# Patient Record
Sex: FEMALE | Race: WHITE | NOT HISPANIC OR LATINO | URBAN - METROPOLITAN AREA
[De-identification: names, ages, dates, MRNs, and addresses within clinical notes are randomized per-mention and may not be internally consistent; named-entity substitution may affect disease eponyms.]

---

## 2020-11-18 ENCOUNTER — EMERGENCY (EMERGENCY)
Facility: HOSPITAL | Age: 85
LOS: 1 days | Discharge: HOME | End: 2020-11-18
Attending: EMERGENCY MEDICINE | Admitting: EMERGENCY MEDICINE
Payer: MEDICARE

## 2020-11-18 VITALS
WEIGHT: 95.9 LBS | RESPIRATION RATE: 16 BRPM | OXYGEN SATURATION: 97 % | HEART RATE: 100 BPM | DIASTOLIC BLOOD PRESSURE: 80 MMHG | HEIGHT: 63 IN | SYSTOLIC BLOOD PRESSURE: 137 MMHG

## 2020-11-18 VITALS
TEMPERATURE: 98 F | OXYGEN SATURATION: 98 % | DIASTOLIC BLOOD PRESSURE: 73 MMHG | HEART RATE: 69 BPM | RESPIRATION RATE: 16 BRPM | SYSTOLIC BLOOD PRESSURE: 138 MMHG

## 2020-11-18 LAB
APPEARANCE UR: CLEAR — SIGNIFICANT CHANGE UP
BACTERIA # UR AUTO: NEGATIVE — SIGNIFICANT CHANGE UP
BASOPHILS # BLD AUTO: 0.03 K/UL — SIGNIFICANT CHANGE UP (ref 0–0.2)
BASOPHILS NFR BLD AUTO: 0.7 % — SIGNIFICANT CHANGE UP (ref 0–2)
BILIRUB UR-MCNC: NEGATIVE — SIGNIFICANT CHANGE UP
CK MB CFR SERPL CALC: 2.4 NG/ML — SIGNIFICANT CHANGE UP (ref 0–3.8)
COLOR SPEC: SIGNIFICANT CHANGE UP
DIFF PNL FLD: NEGATIVE — SIGNIFICANT CHANGE UP
EOSINOPHIL # BLD AUTO: 0.07 K/UL — SIGNIFICANT CHANGE UP (ref 0–0.5)
EOSINOPHIL NFR BLD AUTO: 1.6 % — SIGNIFICANT CHANGE UP (ref 0–6)
EPI CELLS # UR: 0 /HPF — SIGNIFICANT CHANGE UP
GLUCOSE UR QL: NEGATIVE — SIGNIFICANT CHANGE UP
HCT VFR BLD CALC: 45.6 % — HIGH (ref 34.5–45)
HGB BLD-MCNC: 14.8 G/DL — SIGNIFICANT CHANGE UP (ref 11.5–15.5)
IMM GRANULOCYTES NFR BLD AUTO: 0.2 % — SIGNIFICANT CHANGE UP (ref 0–1.5)
KETONES UR-MCNC: NEGATIVE — SIGNIFICANT CHANGE UP
LEUKOCYTE ESTERASE UR-ACNC: NEGATIVE — SIGNIFICANT CHANGE UP
LYMPHOCYTES # BLD AUTO: 0.68 K/UL — LOW (ref 1–3.3)
LYMPHOCYTES # BLD AUTO: 15.1 % — SIGNIFICANT CHANGE UP (ref 13–44)
MCHC RBC-ENTMCNC: 30 PG — SIGNIFICANT CHANGE UP (ref 27–34)
MCHC RBC-ENTMCNC: 32.5 GM/DL — SIGNIFICANT CHANGE UP (ref 32–36)
MCV RBC AUTO: 92.5 FL — SIGNIFICANT CHANGE UP (ref 80–100)
MONOCYTES # BLD AUTO: 0.49 K/UL — SIGNIFICANT CHANGE UP (ref 0–0.9)
MONOCYTES NFR BLD AUTO: 10.9 % — SIGNIFICANT CHANGE UP (ref 2–14)
NEUTROPHILS # BLD AUTO: 3.23 K/UL — SIGNIFICANT CHANGE UP (ref 1.8–7.4)
NEUTROPHILS NFR BLD AUTO: 71.5 % — SIGNIFICANT CHANGE UP (ref 43–77)
NITRITE UR-MCNC: NEGATIVE — SIGNIFICANT CHANGE UP
NRBC # BLD: 0 /100 WBCS — SIGNIFICANT CHANGE UP (ref 0–0)
PH UR: 7.5 — SIGNIFICANT CHANGE UP (ref 5–8)
PLATELET # BLD AUTO: 245 K/UL — SIGNIFICANT CHANGE UP (ref 150–400)
PROT UR-MCNC: NEGATIVE — SIGNIFICANT CHANGE UP
RBC # BLD: 4.93 M/UL — SIGNIFICANT CHANGE UP (ref 3.8–5.2)
RBC # FLD: 13.4 % — SIGNIFICANT CHANGE UP (ref 10.3–14.5)
RBC CASTS # UR COMP ASSIST: 1 /HPF — SIGNIFICANT CHANGE UP (ref 0–4)
SP GR SPEC: 1.01 — SIGNIFICANT CHANGE UP (ref 1.01–1.02)
TROPONIN T, HIGH SENSITIVITY RESULT: 17 NG/L — SIGNIFICANT CHANGE UP (ref 0–51)
UROBILINOGEN FLD QL: NEGATIVE — SIGNIFICANT CHANGE UP
WBC # BLD: 4.51 K/UL — SIGNIFICANT CHANGE UP (ref 3.8–10.5)
WBC # FLD AUTO: 4.51 K/UL — SIGNIFICANT CHANGE UP (ref 3.8–10.5)
WBC UR QL: 0 /HPF — SIGNIFICANT CHANGE UP (ref 0–5)

## 2020-11-18 PROCEDURE — 93005 ELECTROCARDIOGRAM TRACING: CPT

## 2020-11-18 PROCEDURE — 87086 URINE CULTURE/COLONY COUNT: CPT

## 2020-11-18 PROCEDURE — 80053 COMPREHEN METABOLIC PANEL: CPT

## 2020-11-18 PROCEDURE — 99285 EMERGENCY DEPT VISIT HI MDM: CPT

## 2020-11-18 PROCEDURE — 70450 CT HEAD/BRAIN W/O DYE: CPT | Mod: 26

## 2020-11-18 PROCEDURE — 82553 CREATINE MB FRACTION: CPT

## 2020-11-18 PROCEDURE — 93010 ELECTROCARDIOGRAM REPORT: CPT

## 2020-11-18 PROCEDURE — 81001 URINALYSIS AUTO W/SCOPE: CPT

## 2020-11-18 PROCEDURE — 71045 X-RAY EXAM CHEST 1 VIEW: CPT | Mod: 26

## 2020-11-18 PROCEDURE — 99284 EMERGENCY DEPT VISIT MOD MDM: CPT

## 2020-11-18 PROCEDURE — 84484 ASSAY OF TROPONIN QUANT: CPT

## 2020-11-18 PROCEDURE — 85025 COMPLETE CBC W/AUTO DIFF WBC: CPT

## 2020-11-18 PROCEDURE — 70450 CT HEAD/BRAIN W/O DYE: CPT

## 2020-11-18 PROCEDURE — 71045 X-RAY EXAM CHEST 1 VIEW: CPT

## 2020-11-18 RX ORDER — SODIUM CHLORIDE 9 MG/ML
500 INJECTION INTRAMUSCULAR; INTRAVENOUS; SUBCUTANEOUS ONCE
Refills: 0 | Status: COMPLETED | OUTPATIENT
Start: 2020-11-18 | End: 2020-11-18

## 2020-11-18 RX ADMIN — SODIUM CHLORIDE 500 MILLILITER(S): 9 INJECTION INTRAMUSCULAR; INTRAVENOUS; SUBCUTANEOUS at 15:47

## 2020-11-18 NOTE — ED ADULT NURSE NOTE - NSIMPLEMENTINTERV_GEN_ALL_ED
Implemented All Fall with Harm Risk Interventions:  [place] to call system. Call bell, personal items and telephone within reach. Instruct patient to call for assistance. Room bathroom lighting operational. Non-slip footwear when patient is off stretcher. Physically safe environment: no spills, clutter or unnecessary equipment. Stretcher in lowest position, wheels locked, appropriate side rails in place. Provide visual cue, wrist band, yellow gown, etc. Monitor gait and stability. Monitor for mental status changes and reorient to person, place, and time. Review medications for side effects contributing to fall risk. Reinforce activity limits and safety measures with patient and family. Provide visual clues: red socks.

## 2020-11-18 NOTE — ED PROVIDER NOTE - PATIENT PORTAL LINK FT
You can access the FollowMyHealth Patient Portal offered by [facility] by registering at the following website: [email or web address]. By joining [email or web address]’s FollowMyHealth portal, you will also be able to view your health information using other applications (apps) compatible with our system.

## 2020-11-18 NOTE — ED PROVIDER NOTE - ATTENDING CONTRIBUTION TO CARE
89 yo female with no PMHx p/w failure to thrive with decrease po intake, well appearing, vss, labs sent, ivf, signed out pending work up, r/o metabolic vs infectious etiology.

## 2020-11-18 NOTE — ED PROVIDER NOTE - NSFOLLOWUPINSTRUCTIONS_ED_ALL_ED_FT
1.  Stay well hydrated  2.  Continue current home medications  3.  Follow up with your PMD and psychiatry upon discharge  4.  Return to the ER for fevers, weakness, chest pain, shortness of breath or any other concerning symptoms.

## 2020-11-18 NOTE — ED ADULT NURSE REASSESSMENT NOTE - NS ED NURSE REASSESS COMMENT FT1
Patient eating and resting in the stretcher. No pain. Stable. Safety measures maintained. Will continue to monitor.

## 2020-11-18 NOTE — ED PROVIDER NOTE - OBJECTIVE STATEMENT
89 yo female with no PMHx p/w failure to thrive.  Patient reports that she is feeling well and does not know why she is here.  States that she was laying in bed at her nursing facility and told staff that she did not want to get up.  States "I am 89 years old, if I want to stay in bed I should be able to." Denies fevers, coug, sob, chest pain, abd pain, nvd, dysuria.  Discussed case with patient's daughter Tressa who reports that she has had worsening memory issues over the past year.  Feels that she has been more depressed over the past few months 2/2 quarantine.  Does not like to see doctors at the NH. 89 yo female with no PMHx p/w failure to thrive.  Patient reports that she is feeling well and does not know why she is here.  States that she was laying in bed at her nursing facility and told staff that she did not want to get up.  States "I am 89 years old, if I want to stay in bed I should be able to." Denies fevers, coug, sob, chest pain, abd pain, nvd, dysuria.  Discussed case with patient's daughter Tressa who reports that she has had worsening memory issues over the past year.  Feels that she has been more depressed over the past few months 2/2 quarantine. No SI/HI. Does not like to see doctors at the NH.

## 2020-11-18 NOTE — ED ADULT NURSE REASSESSMENT NOTE - NS ED NURSE REASSESS COMMENT FT1
LEONARDO BOOTH called the [facility] to inform LEONARDO Feldman that Pt will be returning to the facility by ambulate in the next few hours. Pt is denying any passive SI at this time.

## 2020-11-18 NOTE — CHART NOTE - NSCHARTNOTEFT_GEN_A_CORE
Covering ED Social Work Note:  Patient referred to Social Work by Medical Team to assist with discharge planning. Medical chart reviewed. As per H&P, “patient is a 89 yo female with no PMHx p/w failure to thrive.  Patient reports that she is feeling well and does not know why she is here.  States that she was laying in bed at her nursing facility and told staff that she did not want to get up.  States "I am 89 years old, if I want to stay in bed I should be able to."  Patient’s daughter contacted by Medical Team for further collateral. As reported by Medical Team, patient is medically cleared to return home.   As noted, patient resides at [facility], memory impairment unit. LMSW contacted facility [phone] and spoke with supervisor Juana. As reported the patient is cleared to return with discharge paperwork. Patient being recommended for Home with [facility]. LMSW sent referral to [facility] via for review. LMSW contacted patient’s daughter Tressa [phone] and left a non-urgent voicemail void of phi with this writer’s contact information. Transportation to be arranged via ED for return home. Social Work will continue to remain available and collaborate with interdisciplinary team.

## 2020-11-18 NOTE — ED ADULT NURSE REASSESSMENT NOTE - NS ED NURSE REASSESS COMMENT FT1
patient resting in the stretcher. No complaints at this time. Safety measures maintained. Awaiting Ct results. Will continue to monitor.

## 2020-11-18 NOTE — ED ADULT NURSE REASSESSMENT NOTE - NS ED NURSE REASSESS COMMENT FT1
Report received from RN Renee. Pt is resting comfortably in bed. Pt toileted. Pt A&Ox2 (now and at baseline). Pt informed of POC and awaiting ambulate back to her skilled nursing facility. Pt has no complaints at this time. Pt safety maintained.

## 2020-11-18 NOTE — ED PROVIDER NOTE - PHYSICAL EXAMINATION
Gen: AAO x 2, NAD  Skin: No rashes or lesions  HEENT: NC/AT, PERRLA, EOMI, MMM  Resp: unlabored CTAB  Cardiac: rrr s1s2, no murmurs, rubs or gallops  GI: ND, +BS, Soft, NT  Ext: no pedal edema, FROM in all extremities  Neuro: no focal deficits

## 2020-11-18 NOTE — ED ADULT NURSE NOTE - OBJECTIVE STATEMENT
89 y/o female coming in from [facility] via EMS to the ER with a c/o failure to thrive. A&Ox2 (person and place). Ambulatory at baseline. PMH dementia. According to patients daughter and EMS patient stopped eating yesterday and reportedly said that "she just wants to die." Patient's daughter denies any significant history. Patient reports, "I am 89 years old I just want to not get up, and die, there is nothing wrong with me."  Capillary refill less than 2 seconds. Patient denies chest pain, SOB, abdominal pain, fevers, chills, headache. MD at the bedside. Safety measures maintained. Will continue to monitor. 89 y/o female coming in from [facility] via EMS to the ER with a c/o failure to thrive. A&Ox2 (person and place). Ambulatory at baseline. PMH dementia. According to EMS her hearing aids were left at the facility. According to patients daughter and EMS patient stopped eating yesterday and reportedly said that "she just wants to die." Patient's daughter denies any significant history. Patient reports, "I am 89 years old I just want to not get up, and die, there is nothing wrong with me."  Capillary refill less than 2 seconds. Patient denies chest pain, SOB, abdominal pain, fevers, chills, headache. MD at the bedside. Safety measures maintained. Will continue to monitor. 89 y/o female coming in from [facility] via EMS to the ER with a c/o failure to thrive. A&Ox2 (person and place). Ambulatory at baseline. PMH dementia. EMS states that her hearing aids were left at the facility. According to patients daughter and EMS patient stopped eating yesterday and reportedly said that "she just wants to die." Patient's daughter denies any significant history. Patient reports, "I am 89 years old I just want to not get up, and die, there is nothing wrong with me."  Capillary refill less than 2 seconds. Patient denies chest pain, SOB, abdominal pain, fevers, chills, headache. MD at the bedside. Safety measures maintained. Will continue to monitor.

## 2020-11-19 LAB
CULTURE RESULTS: NO GROWTH — SIGNIFICANT CHANGE UP
SPECIMEN SOURCE: SIGNIFICANT CHANGE UP

## 2024-10-15 NOTE — ED ADULT TRIAGE NOTE - SPO2 (%)
"  Assessment & Plan     Acute cough  Worsening cough with lots of sputum expectoration , see below   - Symptomatic Influenza A/B, RSV, & SARS-CoV2 PCR (COVID-19) Nose; Future  - Symptomatic Influenza A/B, RSV, & SARS-CoV2 PCR (COVID-19) Nose  - guaiFENesin-codeine (ROBITUSSIN AC) 100-10 MG/5ML solution; Take 5-10 mLs by mouth at bedtime.    Bronchitis  We discussed using the tessalon perls during the day for the cough and then the Robitussin AC at bedtime to help her sleep , lots of hydration , vitamin D and C and zinc supplement daily   If not improving by the end of the week can start the zpack , Rx sent   - benzonatate (TESSALON) 100 MG capsule; Take 1 capsule (100 mg) by mouth 3 times daily as needed for cough.  - azithromycin (ZITHROMAX) 250 MG tablet; Take 2 tablets (500 mg) by mouth daily for 1 day, THEN 1 tablet (250 mg) daily for 4 days.  - albuterol (PROAIR HFA/PROVENTIL HFA/VENTOLIN HFA) 108 (90 Base) MCG/ACT inhaler; Inhale 2 puffs into the lungs every 6 hours as needed for shortness of breath, wheezing or cough.    Letter written for her to be off work till the end of the week , she works in a school       BMI  Estimated body mass index is 27.51 kg/m  as calculated from the following:    Height as of 2/21/24: 1.575 m (5' 2\").    Weight as of this encounter: 68.2 kg (150 lb 6.4 oz).         RTC if no improving or worsening.  Pt is aware  and comfortable with the current plan.      Darlin Topete is a 49 year old, presenting for the following health issues:  URI      10/15/2024    10:14 AM   Additional Questions   Roomed by Tyra BROWNLEE   Accompanied by n/a     URI    History of Present Illness       Reason for visit:  Cough  Symptom onset:  3-7 days ago  Symptoms include:  Cough congestion  Symptom intensity:  Moderate  Symptom progression:  Worsening  Had these symptoms before:  No   She is taking medications regularly.     No fever cough moist congested chest tightness  no asthma now but as a child , "   Mucous not a smoker , no immunosuppressed , 4 days of this and not better   OTC she took Claritin , cough   Acute Illness  Acute illness concerns: Cough  Onset/Duration: 4 days   Symptoms:  Fever: No  Chills/Sweats: No  Headache (location?): No  Sinus Pressure: YES  Conjunctivitis:  No  Ear Pain: no  Rhinorrhea: YES  Congestion: YES  Sore Throat: YES  Cough: YES-non-productive  Wheeze: No  Decreased Appetite: YES  Nausea: No  Vomiting: No  Diarrhea: No  Dysuria/Freq.: No  Dysuria or Hematuria: No  Fatigue/Achiness: YES  Sick/Strep Exposure: YES- Pt works at school  Therapies tried and outcome: Cough drops, hot tea and claritin         Review of Systems  Constitutional, HEENT, cardiovascular, pulmonary, GI, , musculoskeletal, neuro, skin, endocrine and psych systems are negative, except as otherwise noted.      Objective    There were no vitals taken for this visit.  There is no height or weight on file to calculate BMI.  Physical Exam   GENERAL: alert and no distress  EYES: Eyes grossly normal to inspection, PERRL and conjunctivae and sclerae normal  HENT: ear canals and TM's normal, nose and mouth without ulcers or lesions  NECK: no adenopathy, no asymmetry, masses, or scars  RESP: lungs clear to auscultation - no rales, rhonchi or wheezes  CV: regular rate and rhythm, normal S1 S2, no S3 or S4, no murmur, click or rub, no peripheral edema  ABDOMEN: soft, nontender, no hepatosplenomegaly, no masses and bowel sounds normal  MS: no gross musculoskeletal defects noted, no edema    No results found for this or any previous visit (from the past 24 hour(s)).        Signed Electronically by: Evelyn Madden MD     97

## 2025-07-24 NOTE — ED PROVIDER NOTE - CARDIOVASCULAR NEGATIVE STATEMENT, MLM
[No Acute Distress] : no acute distress [Well Nourished] : well nourished [No Deformities] : no deformities [Normal Oropharynx] : normal oropharynx [II] : Mallampati Class: II [Normal Appearance] : normal appearance [No Neck Mass] : no neck mass [Normal Rate/Rhythm] : normal rate/rhythm [Normal S1, S2] : normal s1, s2 [Benign] : benign [No HSM] : no hsm [Normal Gait] : normal gait [Normal Color/ Pigmentation] : normal color/ pigmentation [Oriented x3] : oriented x3 [TextBox_68] : bronchial breath sounds at the right base otherwise clear bilaterally. no chest pain and no edema.